# Patient Record
Sex: FEMALE | Race: WHITE | NOT HISPANIC OR LATINO | Employment: FULL TIME | URBAN - METROPOLITAN AREA
[De-identification: names, ages, dates, MRNs, and addresses within clinical notes are randomized per-mention and may not be internally consistent; named-entity substitution may affect disease eponyms.]

---

## 2017-05-15 ENCOUNTER — APPOINTMENT (OUTPATIENT)
Dept: WOMENS IMAGING | Facility: HOSPITAL | Age: 47
End: 2017-05-15

## 2017-05-15 PROCEDURE — G0202 SCR MAMMO BI INCL CAD: HCPCS | Performed by: RADIOLOGY

## 2017-05-15 PROCEDURE — 77067 SCR MAMMO BI INCL CAD: CPT | Performed by: RADIOLOGY

## 2017-05-15 PROCEDURE — 77063 BREAST TOMOSYNTHESIS BI: CPT | Performed by: RADIOLOGY

## 2017-05-24 ENCOUNTER — APPOINTMENT (OUTPATIENT)
Dept: WOMENS IMAGING | Facility: HOSPITAL | Age: 47
End: 2017-05-24

## 2017-05-24 PROCEDURE — 77065 DX MAMMO INCL CAD UNI: CPT | Performed by: RADIOLOGY

## 2017-05-24 PROCEDURE — G0206 DX MAMMO INCL CAD UNI: HCPCS | Performed by: RADIOLOGY

## 2022-01-18 ENCOUNTER — TELEMEDICINE (OUTPATIENT)
Dept: FAMILY MEDICINE CLINIC | Facility: TELEHEALTH | Age: 52
End: 2022-01-18

## 2022-01-18 DIAGNOSIS — Z20.822 SUSPECTED COVID-19 VIRUS INFECTION: Primary | ICD-10-CM

## 2022-01-18 PROBLEM — N63.0 FIBROUS BREAST LUMPS: Status: ACTIVE | Noted: 2017-12-05

## 2022-01-18 PROCEDURE — 99213 OFFICE O/P EST LOW 20 MIN: CPT | Performed by: NURSE PRACTITIONER

## 2022-01-18 NOTE — PATIENT INSTRUCTIONS
Order has been placed for SARS-CoV-2 (Coronavirus) test to be done at your nearest [facility]. You don't need to call the Urgent Care, just let them know when you arrive that you have been assessed by a Virtual Care Provider and that you have an order for testing. We will call with results when they are available. The results will be released to your [employer] carlos. A [employer] message will also be sent after the first attempted call to notify you that your test results are available. Continue to treat your symptoms just as you would for any viral illness. Take Tylenol and/or Ibuprofen for pain and/or fever, you may find it better to alternate these every 4 hours, so that you are only taking each every 8 hours. Stay hydrated, rest and you may treat cough with over-the-counter cough syrup such as Robitussin. You will need to Self Quarantine per CDC guidelines.3 Key Steps to Take While Waiting for Your COVID-19 Test Result  To help stop the spread of COVID-19, take these 3 key steps NOW while waiting for your test results:  1. Stay home and monitor your health.  Stay home and monitor your health to help protect your friends, family, and others from possibly getting COVID-19 from you.  Stay home and away from others:  · If possible, stay away from others, especially people who are at higher risk for getting very sick from COVID-19, such as older adults and people with other medical conditions.  · If you have been in contact with someone with COVID-19, stay home and away from others for 14 days after your last contact with that person. Follow the recommendations of your local public health department if you need to quarantine.  · If you have a fever, cough or other symptoms of COVID-19, stay home and away from others (except to get medical care).  Monitor your health:  · Watch for fever, cough, shortness of breath, or other symptoms of COVID-19. Remember, symptoms may appear 2-14 days after exposure to COVID-19  and can include:  ? Fever or chills  ? Cough  ? Shortness of breath or difficulty breathing  ? Tiredness  ? Muscle or body aches  ? Headache  ? New loss of taste or smell  ? Sore throat  ? Congestion or runny nose  ? Nausea or vomiting  ? Diarrhea  2. Think about the people you have recently been around.  If you are diagnosed with COVID-19, a public health worker may call you to check on your health, discuss who you have been around, and ask where you spent time while you may have been able to spread COVID-19 to others. While you wait for your COVID-19 test result, think about everyone you have been around recently. This will be important information to give health workers if your test is positive.   Complete the information on the back of this page to help you remember everyone you have been around.   3. Answer the phone call from the health department.  If a public health worker calls you, answer the call to help slow the spread of COVID-19 in your community.  · Discussions with health department staff are confidential. This means that your personal and medical information will be kept private and only shared with those who may need to know, like your health care provider.  · Your name will not be shared with those you came in contact with. The health department will only notify people you were in close contact with (within 6 feet for more than 15 minutes) that they might have been exposed to COVID-19.  Think about the people you have recently been around  If you test positive and are diagnosed with COVID-19, someone from the health department may call to check-in on your health, discuss who you have been around, and ask where you spent time while you may have been able to spread COVID-19 to others. This form can help you think about people you have recently been around so you will be ready if a public health worker calls you.  Things to think about. Have you:  · Gone to work or school?  · Gotten together with  others (eaten out at a restaurant, gone out for drinks, exercised with others or gone to a gym, had friends or family over to your house, volunteered, gone to a party, pool, or park)?  · Gone to a store in person (e.g., grocery store, mall)?  · Gone to in-person appointments (e.g., salon, avila, doctor's or dentist's office)?  · Ridden in a car with others (e.g., rideshare) or taken public transportation?  · Been inside a Jew, Episcopal, Yaz[employer] or other places of Roman Catholic?  Who lives with you?  · ______________________________________________________________________  · ______________________________________________________________________  · ______________________________________________________________________  · ______________________________________________________________________  Who have you been around (less than 6 feet for a total of 15 minutes or more) in the last 10 days? (You may have more people to list than the space provided. If so, write on the front of this sheet or a separate piece of paper.)  Name ______________________________________________  · Phone number ____________________________________  · Date you last saw them _____________________________  · Where you last saw them ________________________________________________  Name ______________________________________________  · Phone number ____________________________________  · Date you last saw them _____________________________  · Where you last saw them ________________________________________________  Name ______________________________________________  · Phone number ____________________________________  · Date you last saw them _____________________________  · Where you last saw them ________________________________________________  Name ______________________________________________  · Phone number ____________________________________  · Date you last saw them _____________________________  · Where you last saw them  ________________________________________________  Name ______________________________________________  · Phone number ____________________________________  · Date you last saw them _____________________________  · Where you last saw them ________________________________________________  What have you done in the last 10 days with other people?  Activity _____________________________________________  · Location _________________________________________  · Date ____________________________________________  Activity _____________________________________________  · Location _________________________________________  · Date ____________________________________________  Activity _____________________________________________  · Location _________________________________________  · Date ____________________________________________  Activity _____________________________________________  · Location _________________________________________  · Date ____________________________________________  Activity _____________________________________________  · Location _________________________________________  · Date ____________________________________________  Centers for Disease Control and Prevention  cdc.gov/coronavirus  07/09/2021  This information is not intended to replace advice given to you by your health care provider. Make sure you discuss any questions you have with your health care provider.  Document Revised: 07/14/2021 Document Reviewed: 07/14/2021  Elsevier Patient Education © 2021 Elsevier Inc.    10 Things You Can Do to Manage Your COVID-19 Symptoms at Home  If you have possible or confirmed COVID-19:  1. Stay home except to get medical care.  2. Monitor your symptoms carefully. If your symptoms get worse, call your healthcare provider immediately.  3. Get rest and stay hydrated.  4. If you have a medical appointment, call the healthcare provider ahead of time and tell them that you have or may have  COVID-19.  5. For medical emergencies, call 911 and notify the dispatch personnel that you have or may have COVID-19.  6. Cover your cough and sneezes with a tissue or use the inside of your elbow.  7. Wash your hands often with soap and water for at least 20 seconds or clean your hands with an alcohol-based hand  that contains at least 60% alcohol.  8. As much as possible, stay in a specific room and away from other people in your home. Also, you should use a separate bathroom, if available. If you need to be around other people in or outside of the home, wear a mask.  9. Avoid sharing personal items with other people in your household, like dishes, towels, and bedding.  10. Clean all surfaces that are touched often, like counters, tabletops, and doorknobs. Use household cleaning sprays or wipes according to the label instructions.  cdc.gov/coronavirus  07/16/2021  This information is not intended to replace advice given to you by your health care provider. Make sure you discuss any questions you have with your health care provider.  Document Revised: 08/04/2021 Document Reviewed: 08/04/2021  [employer] Patient Education © 2021 [employer]    Covid Exposure without Symptoms for NonHealthcare Workers  The date of your last exposure is Day 0.  Symptoms may appear 2-14 days after someone is exposed to the virus. Wear a well-fitting mask around others for 10 days from the date of your last exposure. Get tested at least 5 days after you last had close contact with the infected person. You do not need to stay at home if you do NOT have symptoms, but you have received the primary series of recommended vaccine and booster, if eligible. If you are NOT vaccinated, it is recommended that you stay at home for at least 5 days from your last contact with the infected person and continue to wear a mask around others for an additional 5 days. Watch for fever of 100.4 or greater, cough, shortness of breath, loss of  "taste/smell, sore throat, nasal congestion or other Covid symptoms. If you develop symptoms, get tested immediately and stay home.     Covid Symptoms After Exposure  Anyone exposed to Covid who develops symptoms should immediately quarantine until a negative test confirms symptoms are not attributable to Covid-19. If you are vaccinated and your test is negative, it is recommended that you stay in isolation at least 5 days from symptom onset, unless you are a healthcare worker. If you are a healthcare worker, check with your employer on their policy, as this may vary.    Upper Respiratory Infections  Management is centered around symptom relief. Symptom relief consists of drinking plenty of NON-Caffeinated fluids to thin secretions, cool mist humidifier to sooth sinus congestion and inflammation, warm compresses to face for sinus pain and pressure relief, Nasal rinses to clear mucous. Over the Counter medications can help with symptoms as follows:    NSAIDs (Non-Steroidal Anti-Inflammatory Drugs) such as Ibuprofen are more beneficial for pain and inflammation. (May not be appropriate if you have High blood Pressure, Ulcers or Hx of GI bleeding).  Mucinex can thin the mucous and secretions to help clear the \"snot\" so that you can breath easier. Blow your nose often, use nasal rinse to clear as needed.  Decongestants- oral such as Sudafed (if no heart disease or Hypertension) or nasal such as Afrin for 2-3 days only to help breath through your nose easier and relieve nasal congestion.  Antihistamine-use if seasonal allergies are a problem and/or you feel like you have drainage that could be traveling down the back of the throat and causing cough and/or sore throat.  Steroid nasal spray such as Flonase-help to decrease the inflammation in your nasal and sinus passages and decrease the mucous and nasal congestion.   "

## 2022-01-18 NOTE — PROGRESS NOTES
Mode of Visit: Video  Location of patient: home  You have chosen to receive care through a telehealth visit.  The patient has signed the video visit consent form.  The visit included audio and video interaction. No technical issues occurred during this visit.     Chief Complaint  Sore Throat, Generalized Body Aches, Headache, Earache, and Cough    Subjective          Sabine Parks presents to [facility]  Sore throat and bodyaches with earache and headache but then today noticed that she could not taste her coffee.    Sore Throat   This is a new problem. Associated symptoms include congestion, coughing, ear pain, headaches and a plugged ear sensation. Pertinent negatives include no ear discharge, shortness of breath, trouble swallowing or vomiting.   Headache   The problem occurs constantly. Associated symptoms include coughing, ear pain, sinus pressure and a sore throat. Pertinent negatives include no dizziness, nausea or vomiting.   Earache   Associated symptoms include coughing, headaches and a sore throat. Pertinent negatives include no ear discharge or vomiting.   Cough  The cough is non-productive. Associated symptoms include ear pain, headaches, nasal congestion, postnasal drip and a sore throat. Pertinent negatives include no shortness of breath or wheezing.     Objective   Vital Signs:   There were no vitals taken for this visit.    Physical Exam   Constitutional: She appears well-developed and well-nourished. She appears ill. No distress.   HENT:   Nose: Congestion present.   Pulmonary/Chest: Effort normal.   Neurological: She is alert.   Psychiatric: She has a normal mood and affect.     Result Review :                 Assessment and Plan    Diagnoses and all orders for this visit:    1. Suspected COVID-19 virus infection (Primary)  -     COVID-19,[facility] ROUTINE, NP/OP SWAB IN TRANSPORT MEDIA OR ESWAB 72 HR TAT - Swab, Anterior nasal; Future  -     QUESTIONNAIRE  SERIES        Covid Exposure without Symptoms for NonHealthcare Workers  The date of your last exposure is Day 0.  Symptoms may appear 2-14 days after someone is exposed to the virus. Wear a well-fitting mask around others for 10 days from the date of your last exposure. Get tested at least 5 days after you last had close contact with the infected person. You do not need to stay at home if you do NOT have symptoms, but you have received the primary series of recommended vaccine and booster, if eligible. If you are NOT vaccinated, it is recommended that you stay at home for at least 5 days from your last contact with the infected person and continue to wear a mask around others for an additional 5 days. Watch for fever of 100.4 or greater, cough, shortness of breath, loss of taste/smell, sore throat, nasal congestion or other Covid symptoms. If you develop symptoms, get tested immediately and stay home.     Covid Symptoms After Exposure  Anyone exposed to Covid who develops symptoms should immediately quarantine until a negative test confirms symptoms are not attributable to Covid-19. If you are vaccinated and your test is negative, it is recommended that you stay in isolation at least 5 days from symptom onset, unless you are a healthcare worker. If you are a healthcare worker, check with your employer on their policy, as this may vary.    Upper Respiratory Infections  Management is centered around symptom relief. Symptom relief consists of drinking plenty of NON-Caffeinated fluids to thin secretions, cool mist humidifier to sooth sinus congestion and inflammation, warm compresses to face for sinus pain and pressure relief, Nasal rinses to clear mucous. Over the Counter medications can help with symptoms as follows:    NSAIDs (Non-Steroidal Anti-Inflammatory Drugs) such as Ibuprofen are more beneficial for pain and inflammation. (May not be appropriate if you have High blood Pressure, Ulcers or Hx of GI  "bleeding).  Mucinex can thin the mucous and secretions to help clear the \"snot\" so that you can breath easier. Blow your nose often, use nasal rinse to clear as needed.  Decongestants- oral such as Sudafed (if no heart disease or Hypertension) or nasal such as Afrin for 2-3 days only to help breath through your nose easier and relieve nasal congestion.  Antihistamine-use if seasonal allergies are a problem and/or you feel like you have drainage that could be traveling down the back of the throat and causing cough and/or sore throat.  Steroid nasal spray such as Flonase-help to decrease the inflammation in your nasal and sinus passages and decrease the mucous and nasal congestion.           I spent 20 minutes caring for Sabine on this date of service. This time includes time spent by me in the following activities:preparing for the visit, obtaining and/or reviewing a separately obtained history, performing a medically appropriate examination and/or evaluation , counseling and educating the patient/family/caregiver, ordering medications, tests, or procedures, and documenting information in the medical record  Follow Up   No follow-ups on file.  Patient was given instructions and counseling regarding her condition or for health maintenance advice. Please see specific information pulled into the AVS if appropriate.       "

## 2024-01-31 ENCOUNTER — TELEMEDICINE (OUTPATIENT)
Dept: FAMILY MEDICINE CLINIC | Facility: TELEHEALTH | Age: 54
End: 2024-01-31
Payer: COMMERCIAL

## 2024-01-31 DIAGNOSIS — J06.9 VIRAL URI: Primary | ICD-10-CM

## 2024-01-31 DIAGNOSIS — H69.93 EUSTACHIAN TUBE DISORDER, BILATERAL: ICD-10-CM

## 2024-01-31 RX ORDER — PREDNISONE 10 MG/1
TABLET ORAL DAILY
Qty: 21 EACH | Refills: 0 | Status: SHIPPED | OUTPATIENT
Start: 2024-01-31 | End: 2024-02-06

## 2024-01-31 RX ORDER — DEXTROMETHORPHAN HYDROBROMIDE AND PROMETHAZINE HYDROCHLORIDE 15; 6.25 MG/5ML; MG/5ML
5 SYRUP ORAL 4 TIMES DAILY PRN
Qty: 118 ML | Refills: 0 | Status: SHIPPED | OUTPATIENT
Start: 2024-01-31

## 2024-01-31 RX ORDER — CHLORCYCLIZINE HYDROCHLORIDE AND PSEUDOEPHEDRINE HYDROCHLORIDE 25; 60 MG/1; MG/1
1 TABLET ORAL 3 TIMES DAILY PRN
Qty: 12 TABLET | Refills: 0 | Status: SHIPPED | OUTPATIENT
Start: 2024-01-31

## 2024-01-31 RX ORDER — FLUTICASONE PROPIONATE 50 MCG
2 SPRAY, SUSPENSION (ML) NASAL DAILY
Qty: 16 ML | Refills: 0 | Status: SHIPPED | OUTPATIENT
Start: 2024-01-31

## 2024-01-31 NOTE — LETTER
January 31, 2024     Patient: Sabine Parks   YOB: 1970   Date of Visit: 1/31/2024       To Whom It May Concern:    It is my medical opinion that Sabine Parks may return to work on Friday 2/2/2024. May return sooner with improved symptoms.             Sincerely,    JAYLEEN Rios    CC:   No Recipients

## 2024-01-31 NOTE — PROGRESS NOTES
Subjective   Chief Complaint   Patient presents with    Sinusitis    Ear Fullness       Sabine Parks is a 53 y.o. female.     History of Present Illness  Patient reports sinus congestion, sore throat, cough and ear pressure for about 3 days.  Nasal drainage and sputum is clear.  No fever.  She took a home COVID test that was negative.  She has been exposed to various illnesses at work.  Sinusitis  This is a new problem. Episode onset: 3 days. The problem is unchanged. There has been no fever. Associated symptoms include congestion, coughing, ear pain (pressure), sinus pressure and a sore throat. Pertinent negatives include no chills, diaphoresis or shortness of breath.   Ear Fullness   Associated symptoms include coughing and a sore throat.        No Known Allergies    History reviewed. No pertinent past medical history.    Past Surgical History:   Procedure Laterality Date     SECTION      DENTAL PROCEDURE         Social History     Socioeconomic History    Marital status: Unknown   Tobacco Use    Smoking status: Never    Smokeless tobacco: Never   Substance and Sexual Activity    Alcohol use: No    Drug use: No    Sexual activity: Defer       Family History   Problem Relation Age of Onset    No Known Problems Mother     No Known Problems Father          Current Outpatient Medications:     Chlorcyclizine-Pseudoephed (Stahist AD) 25-60 MG tablet, Take 1 tablet by mouth 3 (Three) Times a Day As Needed (congestion)., Disp: 12 tablet, Rfl: 0    fluticasone (FLONASE) 50 MCG/ACT nasal spray, 2 sprays into the nostril(s) as directed by provider Daily., Disp: 16 mL, Rfl: 0    predniSONE (DELTASONE) 10 MG (21) dose pack, Take  by mouth Daily for 6 days., Disp: 21 each, Rfl: 0    promethazine-dextromethorphan (PROMETHAZINE-DM) 6.25-15 MG/5ML syrup, Take 5 mL by mouth 4 (Four) Times a Day As Needed for Cough., Disp: 118 mL, Rfl: 0      Review of Systems   Constitutional:  Positive for fatigue. Negative for  chills, diaphoresis and fever.   HENT:  Positive for congestion, ear pain (pressure), sinus pressure and sore throat.    Respiratory:  Positive for cough. Negative for chest tightness, shortness of breath and wheezing.    Gastrointestinal: Negative.    Musculoskeletal:  Negative for myalgias.   Neurological:  Negative for headache.        There were no vitals filed for this visit.    Objective   Physical Exam  Constitutional:       General: She is not in acute distress.     Appearance: Normal appearance. She is not ill-appearing, toxic-appearing or diaphoretic.   HENT:      Head: Normocephalic.      Nose: Congestion present.      Mouth/Throat:      Lips: Pink.      Mouth: Mucous membranes are moist.   Pulmonary:      Effort: Pulmonary effort is normal.   Neurological:      Mental Status: She is alert and oriented to person, place, and time.          Procedures     Assessment & Plan   Diagnoses and all orders for this visit:    1. Viral URI (Primary)  -     promethazine-dextromethorphan (PROMETHAZINE-DM) 6.25-15 MG/5ML syrup; Take 5 mL by mouth 4 (Four) Times a Day As Needed for Cough.  Dispense: 118 mL; Refill: 0  -     Chlorcyclizine-Pseudoephed (Stahist AD) 25-60 MG tablet; Take 1 tablet by mouth 3 (Three) Times a Day As Needed (congestion).  Dispense: 12 tablet; Refill: 0  -     fluticasone (FLONASE) 50 MCG/ACT nasal spray; 2 sprays into the nostril(s) as directed by provider Daily.  Dispense: 16 mL; Refill: 0    2. Eustachian tube disorder, bilateral  -     predniSONE (DELTASONE) 10 MG (21) dose pack; Take  by mouth Daily for 6 days.  Dispense: 21 each; Refill: 0  -     Chlorcyclizine-Pseudoephed (Stahist AD) 25-60 MG tablet; Take 1 tablet by mouth 3 (Three) Times a Day As Needed (congestion).  Dispense: 12 tablet; Refill: 0  -     fluticasone (FLONASE) 50 MCG/ACT nasal spray; 2 sprays into the nostril(s) as directed by provider Daily.  Dispense: 16 mL; Refill: 0      Continue eating symptoms and allow 7 to 10  days for viral illness to resolve.  Tylenol for pain and/or fever, stay hydrated and rest.     If symptoms worsen or do not improve follow up with your PCP or visit your nearest Urgent Care Center or ER.      PLAN: Discussed dosing, side effects, recommended other symptomatic care.  Patient should follow up with primary care provider, Urgent Care or ER if symptoms worsen, fail to resolve or other symptoms need attention. Patient/family agree to the above.         JAYLEEN Rios     The use of a video visit has been reviewed with the patient and verbal informed consent has been obtained. Myself and Sabine Parks participated in this visit. The patient is located at [address] [place] [address]. I am located in [place], [place]. Mychart and Zoom were utilized.        This visit was performed via Telehealth.  This patient has been instructed to follow-up with their primary care provider if their symptoms worsen or the treatment provided does not resolve their illness.

## 2024-01-31 NOTE — PATIENT INSTRUCTIONS
Continue eating symptoms and allow 7 to 10 days for viral illness to resolve.  Tylenol for pain and/or fever, stay hydrated and rest.     If symptoms worsen or do not improve follow up with your PCP or visit your nearest Urgent Care Center or ER.

## 2024-08-30 ENCOUNTER — APPOINTMENT (OUTPATIENT)
Dept: GENERAL RADIOLOGY | Facility: HOSPITAL | Age: 54
End: 2024-08-30
Payer: COMMERCIAL

## 2024-08-30 ENCOUNTER — HOSPITAL ENCOUNTER (EMERGENCY)
Facility: HOSPITAL | Age: 54
Discharge: HOME | End: 2024-08-30
Attending: EMERGENCY MEDICINE
Payer: COMMERCIAL

## 2024-08-30 VITALS
SYSTOLIC BLOOD PRESSURE: 134 MMHG | RESPIRATION RATE: 16 BRPM | HEART RATE: 60 BPM | DIASTOLIC BLOOD PRESSURE: 77 MMHG | OXYGEN SATURATION: 100 % | TEMPERATURE: 97.5 F

## 2024-08-30 DIAGNOSIS — M23.92 INTERNAL DERANGEMENT OF LEFT KNEE: Primary | ICD-10-CM

## 2024-08-30 PROCEDURE — 99283 EMERGENCY DEPT VISIT LOW MDM: CPT

## 2024-08-30 PROCEDURE — 99283 EMERGENCY DEPT VISIT LOW MDM: CPT | Performed by: EMERGENCY MEDICINE

## 2024-08-30 PROCEDURE — 73562 X-RAY EXAM OF KNEE 3: CPT

## 2024-08-30 RX ORDER — DICLOFENAC SODIUM 75 MG/1
75 TABLET, DELAYED RELEASE ORAL 2 TIMES DAILY PRN
Qty: 30 TABLET | Refills: 0 | Status: SHIPPED | OUTPATIENT
Start: 2024-08-30

## 2024-08-30 NOTE — DISCHARGE INSTRUCTIONS
You have been placed in a hinged knee brace for suspected nonfracture knee injury, please wear this until seen by orthopedic surgery.  Weightbearing as tolerated on affected extremity until seen by orthopedic surgery.  Rest, ice and elevate knee.  You may remove your knee brace at night and for personal hygiene, please perform range of motion exercises to prevent the knee from locking up on you.  Take prescribed medication.  Return to ER for any concerns.

## 2024-08-30 NOTE — Clinical Note
[facility]  [address]  [facility] [id number]    Sabine Parks was seen and treated in our emergency department on 8/30/2024.  She may return to work on 09/02/2024.         Thank you for choosing [facility].    Fidencio Alcantara MD

## 2024-08-30 NOTE — [facility] PROVIDER NOTE
Subjective   History of Present Illness    Review of Systems   Constitutional: Negative.  Negative for chills, fatigue and fever.   Eyes: Negative.    Respiratory:  Negative for cough, chest tightness and shortness of breath.    Cardiovascular:  Negative for chest pain and palpitations.   Gastrointestinal:  Negative for abdominal pain, diarrhea, nausea and vomiting.   Genitourinary: Negative.    Musculoskeletal:  Positive for arthralgias and joint swelling. Negative for myalgias, neck pain and neck stiffness.   Skin: Negative.  Negative for rash.   Neurological: Negative.  Negative for syncope, weakness, numbness and headaches.   Psychiatric/Behavioral: Negative.     All other systems reviewed and are negative.      History reviewed. No pertinent past medical history.    No Known Allergies    Past Surgical History:   Procedure Laterality Date     SECTION      DENTAL PROCEDURE         Family History   Problem Relation Age of Onset    No Known Problems Mother     No Known Problems Father        Social History     Socioeconomic History    Marital status: Unknown   Tobacco Use    Smoking status: Never    Smokeless tobacco: Never   Substance and Sexual Activity    Alcohol use: No    Drug use: No    Sexual activity: Defer           Objective   Physical Exam  Vitals and nursing note reviewed.   Constitutional:       General: She is in acute distress.      Appearance: Normal appearance. She is normal weight.   HENT:      Right Ear: External ear normal.      Left Ear: External ear normal.      Nose: Nose normal.   Cardiovascular:      Rate and Rhythm: Normal rate.   Pulmonary:      Effort: Pulmonary effort is normal.   Musculoskeletal:         General: Normal range of motion.      Cervical back: Normal range of motion.   Skin:     Capillary Refill: Capillary refill takes less than 2 seconds.   Neurological:      General: No focal deficit present.      Mental Status: She is alert and oriented to person, place, and  time.   Psychiatric:         Mood and Affect: Mood normal.         Procedures           ED Course                                           Medical Decision Making  The patient presents to the emergency room with joint discomfort after sustaining injury/trauma.  Differential diagnosis includes fracture, sprain, strain, dislocation, nonspecific joint pain.  X-ray has been ordered to further evaluate. The patient is otherwise well appearing, hemodynamically stable, and shows no evidence of neurovascular injury or compartment syndrome.    The patient was found to have *** on x-ray.  Patient was placed in an appropriate splint for immobilization and will follow up with orthopedic surgery as an outpatient.  Patient has been prescribed medication if appropriate.        Amount and/or Complexity of Data Reviewed  Radiology: ordered.        Final diagnoses:   None       ED Disposition  ED Disposition       None            No follow-up provider specified.       Medication List      No changes were made to your prescriptions during this visit.          [facility] - [phone]  - [phone] FX  [id number] [facility], [facility] [address]      Phone: [phone]   diclofenac 75 MG EC tablet